# Patient Record
Sex: FEMALE | Race: OTHER
[De-identification: names, ages, dates, MRNs, and addresses within clinical notes are randomized per-mention and may not be internally consistent; named-entity substitution may affect disease eponyms.]

---

## 2020-06-19 ENCOUNTER — HOSPITAL ENCOUNTER (EMERGENCY)
Dept: HOSPITAL 54 - ER | Age: 60
LOS: 1 days | Discharge: HOME | End: 2020-06-20
Payer: MEDICAID

## 2020-06-19 VITALS — HEIGHT: 64 IN | WEIGHT: 140 LBS | BODY MASS INDEX: 23.9 KG/M2

## 2020-06-19 DIAGNOSIS — R50.9: ICD-10-CM

## 2020-06-19 DIAGNOSIS — I10: ICD-10-CM

## 2020-06-19 DIAGNOSIS — R00.0: ICD-10-CM

## 2020-06-19 DIAGNOSIS — M79.10: ICD-10-CM

## 2020-06-19 DIAGNOSIS — E11.9: ICD-10-CM

## 2020-06-19 DIAGNOSIS — U07.1: Primary | ICD-10-CM

## 2020-06-19 PROCEDURE — 80053 COMPREHEN METABOLIC PANEL: CPT

## 2020-06-19 PROCEDURE — 87804 INFLUENZA ASSAY W/OPTIC: CPT

## 2020-06-19 PROCEDURE — 99285 EMERGENCY DEPT VISIT HI MDM: CPT

## 2020-06-19 PROCEDURE — 85025 COMPLETE CBC W/AUTO DIFF WBC: CPT

## 2020-06-19 PROCEDURE — 93005 ELECTROCARDIOGRAM TRACING: CPT

## 2020-06-19 PROCEDURE — 81001 URINALYSIS AUTO W/SCOPE: CPT

## 2020-06-19 PROCEDURE — 87086 URINE CULTURE/COLONY COUNT: CPT

## 2020-06-19 PROCEDURE — 71045 X-RAY EXAM CHEST 1 VIEW: CPT

## 2020-06-19 PROCEDURE — 87186 SC STD MICRODIL/AGAR DIL: CPT

## 2020-06-19 PROCEDURE — 36415 COLL VENOUS BLD VENIPUNCTURE: CPT

## 2020-06-19 PROCEDURE — 87077 CULTURE AEROBIC IDENTIFY: CPT

## 2020-06-19 PROCEDURE — 85730 THROMBOPLASTIN TIME PARTIAL: CPT

## 2020-06-19 NOTE — NUR
PT BIB [facility] WITH A C/O FLU LIKE SYMPTOMS. PT IS C/O FEELING JITTERY AND TIRED. PT 
IS C/O A COUGH. PT'S  IS POSITIVE FOR COVID 19. PT WAS TESTED, BUT 
RESULTS NOT IN. PT IS Thai SPEAKING ONLY. PT WAS TRIAGED AND TAKEN TO ER 7. 
PT WAS PLACED IN A GOWN AND IS ON THE MONITOR/POX.

## 2020-06-20 VITALS — DIASTOLIC BLOOD PRESSURE: 75 MMHG | SYSTOLIC BLOOD PRESSURE: 128 MMHG

## 2020-06-20 LAB
ALBUMIN SERPL BCP-MCNC: 3.5 G/DL (ref 3.4–5)
ALP SERPL-CCNC: 109 U/L (ref 46–116)
ALT SERPL W P-5'-P-CCNC: 24 U/L (ref 12–78)
AST SERPL W P-5'-P-CCNC: 39 U/L (ref 15–37)
BASOPHILS # BLD AUTO: 0 /CMM (ref 0–0.2)
BASOPHILS NFR BLD AUTO: 0.4 % (ref 0–2)
BILIRUB SERPL-MCNC: 0.5 MG/DL (ref 0.2–1)
BUN SERPL-MCNC: 22 MG/DL (ref 7–18)
CALCIUM SERPL-MCNC: 8.9 MG/DL (ref 8.5–10.1)
CHLORIDE SERPL-SCNC: 106 MMOL/L (ref 98–107)
CO2 SERPL-SCNC: 27 MMOL/L (ref 21–32)
CREAT SERPL-MCNC: 1.1 MG/DL (ref 0.6–1.3)
EOSINOPHIL NFR BLD AUTO: 0.1 % (ref 0–6)
GLUCOSE SERPL-MCNC: 155 MG/DL (ref 74–106)
HCT VFR BLD AUTO: 36 % (ref 33–45)
HGB BLD-MCNC: 12 G/DL (ref 11.5–14.8)
LYMPHOCYTES NFR BLD AUTO: 1.4 /CMM (ref 0.8–4.8)
LYMPHOCYTES NFR BLD AUTO: 21.8 % (ref 20–44)
MCHC RBC AUTO-ENTMCNC: 33 G/DL (ref 31–36)
MCV RBC AUTO: 92 FL (ref 82–100)
MONOCYTES NFR BLD AUTO: 0.7 /CMM (ref 0.1–1.3)
MONOCYTES NFR BLD AUTO: 10.8 % (ref 2–12)
NEUTROPHILS # BLD AUTO: 4.4 /CMM (ref 1.8–8.9)
NEUTROPHILS NFR BLD AUTO: 66.9 % (ref 43–81)
PH UR STRIP: 6 [PH] (ref 5–8)
PLATELET # BLD AUTO: 154 /CMM (ref 150–450)
POTASSIUM SERPL-SCNC: 4.2 MMOL/L (ref 3.5–5.1)
PROT SERPL-MCNC: 7.8 G/DL (ref 6.4–8.2)
RBC # BLD AUTO: 3.89 MIL/UL (ref 4–5.2)
RBC #/AREA URNS HPF: (no result) /HPF (ref 0–2)
SODIUM SERPL-SCNC: 137 MMOL/L (ref 136–145)
UROBILINOGEN UR STRIP-MCNC: 1 EU/DL
WBC #/AREA URNS HPF: (no result) /HPF (ref 0–3)
WBC NRBC COR # BLD AUTO: 6.5 K/UL (ref 4.3–11)

## 2020-06-20 NOTE — NUR
IV removed. Catheter intact and site benign. Pressure and 4x4 applied to site. 
No bleeding noted. Patient discharged to home in stable condition. Written and 
verbal after care instructions given. Patient verbalizes understanding of 
instruction. PT'S DAUGHTER REC'D PAPERWORK FROM THE [facility] RE: HOW TO CARE FOR 
SOMEONE WITH COVID 19. PT AMBULATED OUT WITH A STEADY GAIT AND WEARING A MASK. 
VSS.

## 2020-06-20 NOTE — NUR
CALLED PT'S DAUGHTER, GENE,  @ [phone], RE: PT BEING DISCHARGED HOME. 
AS PER [facility], PT IS POSITIVE FOR COVID 19. PT'S DAUGHTER WAS 
NOTIFIED. GENE IS ON HER WAY.